# Patient Record
Sex: MALE | Race: WHITE | Employment: UNEMPLOYED | URBAN - METROPOLITAN AREA
[De-identification: names, ages, dates, MRNs, and addresses within clinical notes are randomized per-mention and may not be internally consistent; named-entity substitution may affect disease eponyms.]

---

## 2021-10-20 PROBLEM — M54.2 NECK PAIN: Status: RESOLVED | Noted: 2021-10-20 | Resolved: 2021-10-20

## 2021-10-20 PROBLEM — R20.2 NUMBNESS AND TINGLING IN BOTH HANDS: Status: RESOLVED | Noted: 2021-10-20 | Resolved: 2021-10-20

## 2021-10-20 PROBLEM — M54.2 NECK PAIN: Status: ACTIVE | Noted: 2021-10-20

## 2021-10-20 PROBLEM — R20.2 NUMBNESS AND TINGLING IN BOTH HANDS: Status: ACTIVE | Noted: 2021-10-20

## 2021-10-20 PROBLEM — R55 SYNCOPE, UNSPECIFIED SYNCOPE TYPE: Status: ACTIVE | Noted: 2021-10-20

## 2021-10-20 PROBLEM — R55 SYNCOPE, UNSPECIFIED SYNCOPE TYPE: Status: RESOLVED | Noted: 2021-10-20 | Resolved: 2021-10-20

## 2021-10-20 PROBLEM — R20.0 NUMBNESS AND TINGLING IN BOTH HANDS: Status: ACTIVE | Noted: 2021-10-20

## 2021-10-20 PROBLEM — R20.0 NUMBNESS AND TINGLING IN BOTH HANDS: Status: RESOLVED | Noted: 2021-10-20 | Resolved: 2021-10-20

## 2021-11-15 PROBLEM — M54.12 CERVICAL RADICULOPATHY: Status: ACTIVE | Noted: 2021-11-15

## 2021-12-13 PROBLEM — M50.30 BULGE OF CERVICAL DISC WITHOUT MYELOPATHY: Status: ACTIVE | Noted: 2021-12-13

## 2021-12-13 PROBLEM — M48.02 FORAMINAL STENOSIS OF CERVICAL REGION: Status: ACTIVE | Noted: 2021-12-13

## 2024-04-09 ENCOUNTER — OFFICE VISIT (OUTPATIENT)
Facility: LOCATION | Age: 39
End: 2024-04-09
Payer: COMMERCIAL

## 2024-04-09 VITALS — TEMPERATURE: 97 F | HEART RATE: 76 BPM

## 2024-04-09 DIAGNOSIS — Z12.11 SCREEN FOR COLON CANCER: Primary | ICD-10-CM

## 2024-04-09 PROCEDURE — S0285 CNSLT BEFORE SCREEN COLONOSC: HCPCS | Performed by: SURGERY

## 2024-04-09 RX ORDER — POLYETHYLENE GLYCOL 3350, SODIUM CHLORIDE, SODIUM BICARBONATE, POTASSIUM CHLORIDE 420; 11.2; 5.72; 1.48 G/4L; G/4L; G/4L; G/4L
POWDER, FOR SOLUTION ORAL
Qty: 1 EACH | Refills: 0 | Status: SHIPPED | OUTPATIENT
Start: 2024-04-09

## 2024-04-09 NOTE — H&P
Antwon Mahajan is a 38 year old male  Chief Complaint   Patient presents with    New Patient     NP - Colonoscopy consult, no family hx, bleeding rectum area, no other symptoms.       REFERRED BY    Patient presents with request for colonoscopy.  Patient denies change in bowel habits he denies chronic abdominal pain he denies unexplained weight loss he denies family history colon cancer or colon polyps although his father is .  He is uncertain.       .           Past Medical History:   Diagnosis Date    Arthritis     left heel    ASTHMA      Past Surgical History:   Procedure Laterality Date    OTHER SURGICAL HISTORY      CYST LEFT HAND  [facility]    OTHER SURGICAL HISTORY      LEFT HEEL FRACTURE  [facility]--plate and screws    SHOULDER ARTHROSCOPY Right 2020    torn labrum     Current Outpatient Medications on File Prior to Visit   Medication Sig Dispense Refill    MELATONIN OR Take by mouth.      Omeprazole 40 MG Oral Capsule Delayed Release Take 1 capsule (40 mg total) by mouth daily. Before meal 30 capsule 1     No current facility-administered medications on file prior to visit.     @ALL@  Family History   Problem Relation Age of Onset    Hypertension Father     Heart Disorder Father     Cancer Father         nasal    Circulatory Problems Father         AAA    Musculo-skelatal Disorder Father     Pulmonary Disease Father      Social History     Socioeconomic History    Marital status: Single    Number of children: 0   Occupational History    Occupation: insurance   Tobacco Use    Smoking status: Former     Packs/day: .75     Types: Cigarettes     Quit date: 10/3/2020     Years since quitting: 3.5    Smokeless tobacco: Current     Types: Chew   Vaping Use    Vaping Use: Never used   Substance and Sexual Activity    Alcohol use: No     Alcohol/week: 0.0 standard drinks of alcohol    Drug use: No    Sexual activity: Never   Other Topics Concern     Service No    Blood Transfusions No     Caffeine Concern Yes    Occupational Exposure No    Hobby Hazards No    Sleep Concern Yes    Stress Concern No    Weight Concern No    Special Diet No    Back Care No    Exercise No    Bike Helmet No    Seat Belt Yes    Self-Exams No       ROS     GENERAL HEALTH: otherwise feels well, no weight loss, no fever or chills  SKIN: denies any unusual skin rashes or jaundice  HEENT: denies nasal congestion, sinus pain or sore throat; hearing loss negative,   EYES , no diplopia or vision changes  RESPIRATORY: denies shortness of breath, wheezing or cough   CARDIOVASCULAR: denies chest pain or HAND; no palpitations   GI: denies nausea, vomiting, constipation, diarrhea; no rectal bleeding; no heartburn  GENITAL/: no dysuria, urgency or frequency, no tea colored urine  MUSCULOSKELETAL: no joint complaints upper or lower extremities  HEMATOLOGY: denies hx anemia; denies bruising or excessive bleeding  Endocrine: no weight gain or loss no hot or cold intolerance    EXAM     Pulse 76, temperature 97 °F (36.1 °C), temperature source Temporal.  GENERAL: well developed, well nourished male, in no apparent distress.    MENTAL STATUS :Alert, oriented x 3  PSYCH: normal mood and affect  SKIN: anicteric, no rashes, no bruising  EYES: PERRLA, EOMI, sclera anicteric,  conjunctiva without pallor  HEENT: normocephalic, atraumatic, TMs clear, nares patent, mouth moist, pharynx w/o erythema  NECK: supple, no JVD, no adenopathy, no thyromegaly  RESPIRATORY: clear to auscultation, no rales , rhonchi or wheezing  CARDIOVASCULAR: RRR, murmur negative  ABDOMEN: normal active BS, soft, nondistended  no HSM, no masses or hernias  LYMPHATIC: no axillary , supraclavicular or inguinal lymphadenopathy  EXTREMITIES: no cyanosis, clubbing or edema, no atrophy, full ROM    STUDIES:     Office Visit on 04/06/2022   Component Date Value Ref Range Status    Strep Grp A Screen 04/06/2022 neg  Negative Final    Control Line Present with a clear * 04/06/2022  yes  Yes/No Final    Kit Lot # 04/06/2022 [id number]  Numeric Final    Kit Expiration Date 04/06/2022 02/28/2023  Date Final       ASSESSMENT   Imp: Average risk screening colonoscopy  PLan: Colonoscopy discussed with patient risk of bleeding and bowel perforation with surgery to repair      Meds & Refills for this Visit:  Requested Prescriptions     Signed Prescriptions Disp Refills    PEG 3350-KCl-Na Bicarb-NaCl (TRILYTE) 420 g Oral Recon Soln 1 each 0     Sig: Starting at 4:00 pm the night before procedure, drink 8 ounces of the prep every 15-20 minutes until finished       Imaging & Consults:  None

## 2024-06-04 ENCOUNTER — TELEPHONE (OUTPATIENT)
Facility: LOCATION | Age: 39
End: 2024-06-04

## 2024-06-04 NOTE — TELEPHONE ENCOUNTER
Spoke with Jeaneth at pharmacy and prescription should be ready by the end of the day.  Notified patient who verbalized understanding.

## 2024-06-04 NOTE — TELEPHONE ENCOUNTER
Patient went to [facility] to  his prep prescription and was told it had .  Patient needs new prep prescription sent to his pharmacy.

## 2024-06-14 ENCOUNTER — TELEPHONE (OUTPATIENT)
Facility: LOCATION | Age: 39
End: 2024-06-14

## 2024-06-14 NOTE — TELEPHONE ENCOUNTER
Prior authorization approved for cpt code 13081. Ref #: [id number].     Start date: 6/5/24  End date: 8/3/25    Approved for MAC anesthesia. Ref #: [id number].    Start date: 6/14/24  End date: 8/14/24

## 2024-06-17 ENCOUNTER — TELEPHONE (OUTPATIENT)
Facility: LOCATION | Age: 39
End: 2024-06-17

## 2024-06-17 NOTE — TELEPHONE ENCOUNTER
Patient received a letter in the mail from his insurance informing him that the colonoscopy is covered but his anaesthesia is not, because the anaesthesia is not medically necessary. In the past when this has happened, his doctor has told the insurance about his asthma, which has solved the issue. He is wondering what needs to be done so he doesn't have to pay the anaesthesia out of pocket for the colonoscopy.   Patient has also sent a ttwick message concerning this.    Please advise  Best callback number is [phone]    The number for the clinical review team at his insurance is [phone]

## 2024-06-19 ENCOUNTER — LAB REQUISITION (OUTPATIENT)
Age: 39
End: 2024-06-19

## 2024-06-19 DIAGNOSIS — Z12.11 COLON CANCER SCREENING: ICD-10-CM

## 2024-06-19 PROCEDURE — 88305 TISSUE EXAM BY PATHOLOGIST: CPT | Performed by: SURGERY

## 2024-06-27 ENCOUNTER — TELEPHONE (OUTPATIENT)
Facility: LOCATION | Age: 39
End: 2024-06-27

## 2024-06-27 NOTE — TELEPHONE ENCOUNTER
----- Message from Jonah Tellez sent at 6/27/2024  1:58 PM CDT -----  Recall colonoscopy 3 years  --------------    Recall placed, and Care Gaps updated.

## 2024-09-26 ENCOUNTER — OFFICE VISIT (OUTPATIENT)
Dept: FAMILY MEDICINE CLINIC | Facility: CLINIC | Age: 39
End: 2024-09-26
Payer: COMMERCIAL

## 2024-09-26 ENCOUNTER — PATIENT MESSAGE (OUTPATIENT)
Dept: FAMILY MEDICINE CLINIC | Facility: CLINIC | Age: 39
End: 2024-09-26

## 2024-09-26 ENCOUNTER — LAB ENCOUNTER (OUTPATIENT)
Dept: LAB | Age: 39
End: 2024-09-26
Attending: FAMILY MEDICINE
Payer: COMMERCIAL

## 2024-09-26 VITALS
HEIGHT: 69 IN | SYSTOLIC BLOOD PRESSURE: 110 MMHG | BODY MASS INDEX: 27.85 KG/M2 | DIASTOLIC BLOOD PRESSURE: 78 MMHG | TEMPERATURE: 98 F | HEART RATE: 60 BPM | WEIGHT: 188 LBS | RESPIRATION RATE: 16 BRPM

## 2024-09-26 DIAGNOSIS — Z12.5 SCREENING FOR MALIGNANT NEOPLASM OF PROSTATE: ICD-10-CM

## 2024-09-26 DIAGNOSIS — R10.2 SUPRAPUBIC PAIN: ICD-10-CM

## 2024-09-26 DIAGNOSIS — R93.5 ABNORMAL FINDINGS ON DIAGNOSTIC IMAGING OF ABDOMEN: ICD-10-CM

## 2024-09-26 DIAGNOSIS — R36.1 HEMATOSPERMIA: ICD-10-CM

## 2024-09-26 DIAGNOSIS — Z00.00 ROUTINE HEALTH MAINTENANCE: Primary | ICD-10-CM

## 2024-09-26 DIAGNOSIS — Z00.00 ROUTINE HEALTH MAINTENANCE: ICD-10-CM

## 2024-09-26 DIAGNOSIS — G47.33 OSA ON CPAP: ICD-10-CM

## 2024-09-26 DIAGNOSIS — N50.811 PAIN IN RIGHT TESTICLE: ICD-10-CM

## 2024-09-26 PROBLEM — E06.3 HASHIMOTO'S THYROIDITIS: Status: ACTIVE | Noted: 2024-01-09

## 2024-09-26 LAB
ALBUMIN SERPL-MCNC: 5.1 G/DL (ref 3.2–4.8)
ALBUMIN/GLOB SERPL: 1.8 {RATIO} (ref 1–2)
ALP LIVER SERPL-CCNC: 67 U/L
ALT SERPL-CCNC: 25 U/L
ANION GAP SERPL CALC-SCNC: 2 MMOL/L (ref 0–18)
AST SERPL-CCNC: 22 U/L (ref ?–34)
BASOPHILS # BLD AUTO: 0.05 X10(3) UL (ref 0–0.2)
BASOPHILS NFR BLD AUTO: 0.7 %
BILIRUB SERPL-MCNC: 0.7 MG/DL (ref 0.3–1.2)
BILIRUB UR QL STRIP.AUTO: NEGATIVE
BUN BLD-MCNC: 14 MG/DL (ref 9–23)
CALCIUM BLD-MCNC: 10.4 MG/DL (ref 8.7–10.4)
CHLORIDE SERPL-SCNC: 107 MMOL/L (ref 98–112)
CHOLEST SERPL-MCNC: 226 MG/DL (ref ?–200)
CLARITY UR REFRACT.AUTO: CLEAR
CO2 SERPL-SCNC: 28 MMOL/L (ref 21–32)
COMPLEXED PSA SERPL-MCNC: 0.48 NG/ML (ref ?–4)
CREAT BLD-MCNC: 1.21 MG/DL
EGFRCR SERPLBLD CKD-EPI 2021: 77 ML/MIN/1.73M2 (ref 60–?)
EOSINOPHIL # BLD AUTO: 0.21 X10(3) UL (ref 0–0.7)
EOSINOPHIL NFR BLD AUTO: 2.9 %
ERYTHROCYTE [DISTWIDTH] IN BLOOD BY AUTOMATED COUNT: 12.7 %
EST. AVERAGE GLUCOSE BLD GHB EST-MCNC: 108 MG/DL (ref 68–126)
FASTING PATIENT LIPID ANSWER: YES
FASTING STATUS PATIENT QL REPORTED: YES
GLOBULIN PLAS-MCNC: 2.8 G/DL (ref 2–3.5)
GLUCOSE BLD-MCNC: 94 MG/DL (ref 70–99)
GLUCOSE UR STRIP.AUTO-MCNC: NORMAL MG/DL
HBA1C MFR BLD: 5.4 % (ref ?–5.7)
HCT VFR BLD AUTO: 41.8 %
HDLC SERPL-MCNC: 48 MG/DL (ref 40–59)
HGB BLD-MCNC: 14.4 G/DL
IMM GRANULOCYTES # BLD AUTO: 0.02 X10(3) UL (ref 0–1)
IMM GRANULOCYTES NFR BLD: 0.3 %
KETONES UR STRIP.AUTO-MCNC: NEGATIVE MG/DL
LDLC SERPL CALC-MCNC: 156 MG/DL (ref ?–100)
LEUKOCYTE ESTERASE UR QL STRIP.AUTO: NEGATIVE
LYMPHOCYTES # BLD AUTO: 2.65 X10(3) UL (ref 1–4)
LYMPHOCYTES NFR BLD AUTO: 37.1 %
MCH RBC QN AUTO: 31.8 PG (ref 26–34)
MCHC RBC AUTO-ENTMCNC: 34.4 G/DL (ref 31–37)
MCV RBC AUTO: 92.3 FL
MONOCYTES # BLD AUTO: 0.62 X10(3) UL (ref 0.1–1)
MONOCYTES NFR BLD AUTO: 8.7 %
NEUTROPHILS # BLD AUTO: 3.6 X10 (3) UL (ref 1.5–7.7)
NEUTROPHILS # BLD AUTO: 3.6 X10(3) UL (ref 1.5–7.7)
NEUTROPHILS NFR BLD AUTO: 50.3 %
NITRITE UR QL STRIP.AUTO: NEGATIVE
NONHDLC SERPL-MCNC: 178 MG/DL (ref ?–130)
OSMOLALITY SERPL CALC.SUM OF ELEC: 284 MOSM/KG (ref 275–295)
PH UR STRIP.AUTO: 5.5 [PH] (ref 5–8)
PLATELET # BLD AUTO: 259 10(3)UL (ref 150–450)
POTASSIUM SERPL-SCNC: 5 MMOL/L (ref 3.5–5.1)
PROT SERPL-MCNC: 7.9 G/DL (ref 5.7–8.2)
PROT UR STRIP.AUTO-MCNC: NEGATIVE MG/DL
RBC # BLD AUTO: 4.53 X10(6)UL
RBC UR QL AUTO: NEGATIVE
SODIUM SERPL-SCNC: 137 MMOL/L (ref 136–145)
SP GR UR STRIP.AUTO: 1.02 (ref 1–1.03)
TRIGL SERPL-MCNC: 121 MG/DL (ref 30–149)
TSI SER-ACNC: 2.97 MIU/ML (ref 0.55–4.78)
UROBILINOGEN UR STRIP.AUTO-MCNC: NORMAL MG/DL
VLDLC SERPL CALC-MCNC: 23 MG/DL (ref 0–30)
WBC # BLD AUTO: 7.2 X10(3) UL (ref 4–11)

## 2024-09-26 PROCEDURE — 81003 URINALYSIS AUTO W/O SCOPE: CPT

## 2024-09-26 PROCEDURE — 80053 COMPREHEN METABOLIC PANEL: CPT

## 2024-09-26 PROCEDURE — 36415 COLL VENOUS BLD VENIPUNCTURE: CPT

## 2024-09-26 PROCEDURE — 83036 HEMOGLOBIN GLYCOSYLATED A1C: CPT

## 2024-09-26 PROCEDURE — 85025 COMPLETE CBC W/AUTO DIFF WBC: CPT

## 2024-09-26 PROCEDURE — 80061 LIPID PANEL: CPT

## 2024-09-26 PROCEDURE — 84443 ASSAY THYROID STIM HORMONE: CPT

## 2024-09-26 PROCEDURE — 99203 OFFICE O/P NEW LOW 30 MIN: CPT | Performed by: FAMILY MEDICINE

## 2024-09-26 PROCEDURE — 99386 PREV VISIT NEW AGE 40-64: CPT | Performed by: FAMILY MEDICINE
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
## 2024-09-26 NOTE — PATIENT INSTRUCTIONS
Heart Scan:  [email or web address]  OR call [phone]

## 2024-10-14 ENCOUNTER — PATIENT MESSAGE (OUTPATIENT)
Dept: FAMILY MEDICINE CLINIC | Facility: CLINIC | Age: 39
End: 2024-10-14

## 2024-10-15 ENCOUNTER — PATIENT MESSAGE (OUTPATIENT)
Dept: FAMILY MEDICINE CLINIC | Facility: CLINIC | Age: 39
End: 2024-10-15

## 2024-10-18 ENCOUNTER — ORDER TRANSCRIPTION (OUTPATIENT)
Dept: ADMINISTRATIVE | Facility: HOSPITAL | Age: 39
End: 2024-10-18

## 2024-10-18 DIAGNOSIS — Z13.6 SCREENING FOR CARDIOVASCULAR CONDITION: Primary | ICD-10-CM

## 2024-10-21 ENCOUNTER — HOSPITAL ENCOUNTER (OUTPATIENT)
Dept: CT IMAGING | Facility: HOSPITAL | Age: 39
End: 2024-10-21
Attending: FAMILY MEDICINE

## 2024-10-21 DIAGNOSIS — Z13.6 SCREENING FOR CARDIOVASCULAR CONDITION: ICD-10-CM

## 2024-10-29 ENCOUNTER — HOSPITAL ENCOUNTER (OUTPATIENT)
Dept: MRI IMAGING | Facility: HOSPITAL | Age: 39
Discharge: HOME | End: 2024-10-29
Attending: FAMILY MEDICINE
Payer: COMMERCIAL

## 2024-10-29 DIAGNOSIS — R93.5 ABNORMAL FINDINGS ON DIAGNOSTIC IMAGING OF ABDOMEN: ICD-10-CM

## 2024-10-29 PROCEDURE — 74183 MRI ABD W/O CNTR FLWD CNTR: CPT | Performed by: FAMILY MEDICINE

## 2024-10-29 PROCEDURE — A9575 INJ GADOTERATE MEGLUMI 0.1ML: HCPCS

## 2024-10-29 RX ORDER — GADOTERATE MEGLUMINE 376.9 MG/ML
20 INJECTION INTRAVENOUS
Status: COMPLETED | OUTPATIENT
Start: 2024-10-29 | End: 2024-10-29

## 2024-10-29 RX ADMIN — GADOTERATE MEGLUMINE 17 ML: 376.9 INJECTION INTRAVENOUS at 07:58:00

## 2024-11-21 ENCOUNTER — HOSPITAL ENCOUNTER (OUTPATIENT)
Dept: ULTRASOUND IMAGING | Facility: HOSPITAL | Age: 39
Discharge: HOME | End: 2024-11-21
Attending: FAMILY MEDICINE
Payer: COMMERCIAL

## 2024-11-21 DIAGNOSIS — R10.2 SUPRAPUBIC PAIN: ICD-10-CM

## 2024-11-21 DIAGNOSIS — N50.811 PAIN IN RIGHT TESTICLE: ICD-10-CM

## 2024-11-21 DIAGNOSIS — R36.1 HEMATOSPERMIA: ICD-10-CM

## 2024-11-21 PROCEDURE — 76870 US EXAM SCROTUM: CPT | Performed by: FAMILY MEDICINE

## 2024-11-21 PROCEDURE — 93975 VASCULAR STUDY: CPT | Performed by: FAMILY MEDICINE

## 2024-12-27 ENCOUNTER — OFFICE VISIT (OUTPATIENT)
Dept: FAMILY MEDICINE CLINIC | Facility: CLINIC | Age: 39
End: 2024-12-27
Payer: COMMERCIAL

## 2024-12-27 VITALS
WEIGHT: 196.63 LBS | TEMPERATURE: 98 F | HEART RATE: 60 BPM | SYSTOLIC BLOOD PRESSURE: 102 MMHG | RESPIRATION RATE: 16 BRPM | BODY MASS INDEX: 29 KG/M2 | DIASTOLIC BLOOD PRESSURE: 64 MMHG | OXYGEN SATURATION: 97 %

## 2024-12-27 DIAGNOSIS — M54.50 ACUTE BILATERAL LOW BACK PAIN WITHOUT SCIATICA: ICD-10-CM

## 2024-12-27 DIAGNOSIS — E78.2 MODERATE MIXED HYPERLIPIDEMIA NOT REQUIRING STATIN THERAPY: Primary | ICD-10-CM

## 2024-12-27 PROCEDURE — 99213 OFFICE O/P EST LOW 20 MIN: CPT | Performed by: FAMILY MEDICINE
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
## 2025-02-24 ENCOUNTER — PATIENT MESSAGE (OUTPATIENT)
Facility: CLINIC | Age: 40
End: 2025-02-24

## 2025-03-17 ENCOUNTER — OFFICE VISIT (OUTPATIENT)
Facility: CLINIC | Age: 40
End: 2025-03-17
Payer: COMMERCIAL

## 2025-03-17 VITALS
DIASTOLIC BLOOD PRESSURE: 64 MMHG | WEIGHT: 195 LBS | BODY MASS INDEX: 28.88 KG/M2 | HEART RATE: 64 BPM | SYSTOLIC BLOOD PRESSURE: 110 MMHG | HEIGHT: 69 IN | RESPIRATION RATE: 16 BRPM | OXYGEN SATURATION: 96 %

## 2025-03-17 DIAGNOSIS — G47.33 OSA ON CPAP: Primary | ICD-10-CM

## 2025-03-17 DIAGNOSIS — M48.02 FORAMINAL STENOSIS OF CERVICAL REGION: ICD-10-CM

## 2025-03-17 DIAGNOSIS — E06.3 HASHIMOTO'S THYROIDITIS: ICD-10-CM

## 2025-03-17 PROCEDURE — 99204 OFFICE O/P NEW MOD 45 MIN: CPT | Performed by: OTHER

## 2025-03-17 NOTE — PROGRESS NOTES
[facility]  SLEEP PROGRESS NOTE        HPI:   This is a 39 year old male coming in for   Chief Complaint   Patient presents with    New Patient     Pt here fro sleep consult.  Pt requests different type  of mask and current machine is from   Sleep questionnaire:        HPI: Prior DULY   2022 AHI 17.8, SaO2 virgilio is 65%  Sleep hours 830/930 until   Works from home      Has younger kids  Sleeps on sides and back    Benefits greatly from using cpap   Using nasal pillows  Weight stable    RLS intermittent        Patient: Sleep review of systems today: see form.      Pt  PCP:  Ilene Young DO  No referring provider defined for this encounter.           No data to display                    Past Medical History:    Allergic rhinitis    Anxiety    Arthritis    left heel    ASTHMA    Asthma (HCC)    Hyperthyroidism    Hypothyroidism    Sleep apnea     Past Surgical History:   Procedure Laterality Date    Colonoscopy      Other surgical history      CYST LEFT HAND  [facility]    Other surgical history      LEFT HEEL FRACTURE  [facility]--plate and screws    Shoulder arthroscopy Right 2020    torn labrum     Social History:  Social History     Social History Narrative    Not on file     Social History     Socioeconomic History    Marital status: Life Partner    Number of children: 0   Occupational History    Occupation: insurance   Tobacco Use    Smoking status: Former     Current packs/day: 0.00     Average packs/day: 0.8 packs/day for 26.8 years (20.1 ttl pk-yrs)     Types: Cigarettes, Cigars     Start date:      Quit date: 10/3/2022     Years since quittin.4    Smokeless tobacco: Former     Types: Chew     Quit date: 2023   Vaping Use    Vaping status: Never Used   Substance and Sexual Activity    Alcohol use: Yes     Comment: 4 drinks a weekend    Drug use: No    Sexual activity: Never   Other Topics Concern     Service No    Blood Transfusions No    Caffeine  Concern Yes    Occupational Exposure No    Hobby Hazards No    Sleep Concern Yes    Stress Concern No    Weight Concern No    Special Diet No    Back Care No    Exercise No    Bike Helmet No    Seat Belt Yes    Self-Exams No     Family History:  Family History   Problem Relation Age of Onset    Hypertension Father     Heart Disorder Father     Cancer Father         nasal    Circulatory Problems Father         AAA    Musculo-skelatal Disorder Father     Pulmonary Disease Father      Allergies:  Allergies[1]  Current Meds:  No current outpatient medications on file.      Counseling given: Not Answered         Problem List:  Patient Active Problem List   Diagnosis    Cervical radiculopathy    Foraminal stenosis of cervical region    Bulge of cervical disc without myelopathy    Hashimoto's thyroiditis    VINOD on CPAP       REVIEW OF SYSTEMS:   Review of Systems    EXAM:   /64   Pulse 64   Resp 16   Ht 5' 9\" (1.753 m)   Wt 195 lb (88.5 kg)   SpO2 96%   BMI 28.80 kg/m²  Estimated body mass index is 28.8 kg/m² as calculated from the following:    Height as of this encounter: 5' 9\" (1.753 m).    Weight as of this encounter: 195 lb (88.5 kg).   Neck in inches:      Wt Readings from Last 6 Encounters:   03/17/25 195 lb (88.5 kg)   12/27/24 196 lb 9.6 oz (89.2 kg)   09/26/24 188 lb (85.3 kg)   04/06/22 189 lb (85.7 kg)   01/14/22 200 lb (90.7 kg)   12/16/21 198 lb (89.8 kg)     BP Readings from Last 3 Encounters:   03/17/25 110/64   12/27/24 102/64   09/26/24 110/78     Pulse Readings from Last 3 Encounters:   03/17/25 64   12/27/24 60   09/26/24 60     SpO2 Readings from Last 3 Encounters:   03/17/25 96%   12/27/24 97%   04/06/22 97%      Ideal body weight: 70.7 kg (155 lb 13.8 oz)  Adjusted ideal body weight: 77.8 kg (171 lb 8.3 oz)    Vital signs reviewed.  Physical Exam    ASSESSMENT AND PLAN:   1. VINOD on CPAP  Needs new DME and supplies  Patient is using and benefiting from regular cpap use.  Patient was  instructed to clean equipment on a weekly basis.  Patient was instructed to keep up to date with supplies.  Patient was informed to avoid drowsy driving, or using heavy machinery.  Patient was instructed to followup on a annual basis.   2. Hashimoto's thyroiditis    3. Foraminal stenosis of cervical region    There are no Patient Instructions on file for this visit.    Independent interpretation of Sleep Download as defined above.  Continue with Rx management of Sleep apnea with PAP therapy.    COMPLIANCE is required by insurance for 4 hours a night most nights of the week.    Advised if still with sleep apnea and not using CPAP has a 7 fold increase in risk of heart attack, stroke, abnormal heart rhythm  and death,  increased risk of driving accidents.     Advised to refrain from driving when sleepy.      Recommend weight loss, and maintain and optimal BMI with Exercise 30 minutes most days to target heart rate .     Advised patient to change filters,masks,hoses  and tubes and equiptment on a  regular schedule.    Filters and seals shall be changed every 1 month,  Hoses every 3 months,   CPAP mask and humidifier chamber changed every 6 month  with the durable medical equipment provider.         Meds & Refills for this Visit:  Requested Prescriptions      No prescriptions requested or ordered in this encounter       Outcome: Parent verbalizes understanding. Parent is notified to call with any questions, complications, allergies, or worsening or changing symptoms.  Parent is to call with any side effects or complications from the treatments as a result of today.     \" This note was created utilizing Dragon speech recognition software.  Please excuse any grammatical errors. Call my office if you have any questions regarding this note. \"     Karon Sandoval,   3/17/2025  1:29 PM       [1] No Known Allergies

## 2025-04-05 ENCOUNTER — HOSPITAL ENCOUNTER (OUTPATIENT)
Age: 40
Discharge: HOME | End: 2025-04-05
Payer: COMMERCIAL

## 2025-04-05 ENCOUNTER — APPOINTMENT (OUTPATIENT)
Dept: GENERAL RADIOLOGY | Age: 40
End: 2025-04-05
Attending: NURSE PRACTITIONER
Payer: COMMERCIAL

## 2025-04-05 VITALS
DIASTOLIC BLOOD PRESSURE: 69 MMHG | TEMPERATURE: 98 F | RESPIRATION RATE: 18 BRPM | SYSTOLIC BLOOD PRESSURE: 100 MMHG | OXYGEN SATURATION: 97 % | HEART RATE: 77 BPM

## 2025-04-05 DIAGNOSIS — S59.901A INJURY OF RIGHT ELBOW, INITIAL ENCOUNTER: ICD-10-CM

## 2025-04-05 DIAGNOSIS — S52.134A CLOSED NONDISPLACED FRACTURE OF NECK OF RIGHT RADIUS, INITIAL ENCOUNTER: Primary | ICD-10-CM

## 2025-04-05 DIAGNOSIS — S69.91XA INJURY OF RIGHT WRIST, INITIAL ENCOUNTER: ICD-10-CM
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
## 2025-04-05 PROCEDURE — A4565 SLINGS: HCPCS | Performed by: NURSE PRACTITIONER

## 2025-04-05 PROCEDURE — 73080 X-RAY EXAM OF ELBOW: CPT | Performed by: NURSE PRACTITIONER

## 2025-04-05 PROCEDURE — 73110 X-RAY EXAM OF WRIST: CPT | Performed by: NURSE PRACTITIONER

## 2025-04-05 PROCEDURE — 99204 OFFICE O/P NEW MOD 45 MIN: CPT | Performed by: NURSE PRACTITIONER

## 2025-04-05 PROCEDURE — 29105 APPLICATION LONG ARM SPLINT: CPT | Performed by: NURSE PRACTITIONER

## 2025-04-05 RX ORDER — HYDROCODONE BITARTRATE AND ACETAMINOPHEN 5; 325 MG/1; MG/1
1 TABLET ORAL EVERY 8 HOURS PRN
Qty: 12 TABLET | Refills: 0 | Status: SHIPPED | OUTPATIENT
Start: 2025-04-05 | End: 2025-04-07

## 2025-04-05 RX ORDER — NAPROXEN 500 MG/1
500 TABLET ORAL 2 TIMES DAILY WITH MEALS
Qty: 20 TABLET | Refills: 0 | Status: SHIPPED | OUTPATIENT
Start: 2025-04-05 | End: 2025-04-15

## 2025-04-05 NOTE — ED PROVIDER NOTES
Patient Seen in: [facility]mediate [facility]      History     Chief Complaint   Patient presents with    Arm or Hand Injury     Stated Complaint: arm pain    Subjective: This is a 39-year-old male, past medical history of asthma, arthritis, anxiety, allergic rhinitis, obstructive sleep apnea, thyroid disorder, presents to immediate care clinic for evaluation of right upper extremity injury, sustained yesterday while playing basketball with his children.  Patient reports FOOSH like injury and mechanism.  He does state that his arm was in a position of like supination and pronation.  He is tender to wrist and elbow.  Patient reports \"I hit my funny bone\".  Patient has decreased range of motion of elbow.  Pain with supination and pronation as well as flexion extension.  CMS intact distally.  Strong radial pulse.  No obvious asymmetry deformity.  Positive soft tissue swelling of elbow joint. AOX4  The history is provided by the patient.             Objective:     Past Medical History:    Allergic rhinitis    Anxiety    Arthritis    left heel    ASTHMA    Asthma (HCC)    Hyperthyroidism    Hypothyroidism    Sleep apnea              Past Surgical History:   Procedure Laterality Date    Colonoscopy      Other surgical history      CYST LEFT HAND  [facility] OR[facility]O    Other surgical history      LEFT HEEL FRACTURE  [facility]--plate and screws    Shoulder arthroscopy Right 2020    torn labrum                Social History     Socioeconomic History    Marital status: Life Partner    Number of children: 0   Occupational History    Occupation: insurance   Tobacco Use    Smoking status: Former     Current packs/day: 0.00     Average packs/day: 0.8 packs/day for 26.8 years (20.1 ttl pk-yrs)     Types: Cigarettes, Cigars     Start date:      Quit date: 10/3/2022     Years since quittin.5    Smokeless tobacco: Former     Types: Chew     Quit date: 2023   Vaping Use    Vaping status: Never Used   Substance and Sexual  Activity    Alcohol use: Yes     Comment: 4 drinks a weekend    Drug use: No    Sexual activity: Never   Other Topics Concern     Service No    Blood Transfusions No    Caffeine Concern Yes    Occupational Exposure No    Hobby Hazards No    Sleep Concern Yes    Stress Concern No    Weight Concern No    Special Diet No    Back Care No    Exercise No    Bike Helmet No    Seat Belt Yes    Self-Exams No              Review of Systems   Constitutional: Negative.    Musculoskeletal:  Positive for arthralgias and joint swelling. Negative for back pain, gait problem, myalgias, neck pain and neck stiffness.   Skin: Negative.  Negative for color change and wound.       Positive for stated complaint: arm pain  Other systems are as noted in HPI.  Constitutional and vital signs reviewed.      All other systems reviewed and negative except as noted above.    Physical Exam     ED Triage Vitals [04/05/25 1509]   /69   Pulse 77   Resp 18   Temp 98.2 °F (36.8 °C)   Temp src Oral   SpO2 97 %   O2 Device None (Room air)       Current Vitals:   Vital Signs  BP: 100/69  Pulse: 77  Resp: 18  Temp: 98.2 °F (36.8 °C)  Temp src: Oral    Oxygen Therapy  SpO2: 97 %  O2 Device: None (Room air)        Physical Exam  Constitutional:       General: He is not in acute distress.     Appearance: Normal appearance. He is not ill-appearing or toxic-appearing.   Cardiovascular:      Rate and Rhythm: Normal rate.      Pulses: Normal pulses.   Pulmonary:      Effort: Pulmonary effort is normal.   Musculoskeletal:         General: Swelling, tenderness and signs of injury present. No deformity.      Right elbow: Swelling and effusion present. No deformity or lacerations. Decreased range of motion. Tenderness present in radial head, medial epicondyle and lateral epicondyle.        Arms:       Comments: Strong radial pulse.  Good cap refill   Skin:     General: Skin is warm.      Capillary Refill: Capillary refill takes less than 2 seconds.       Findings: No erythema.   Neurological:      General: No focal deficit present.      Mental Status: He is alert and oriented to person, place, and time.             ED Course   Labs Reviewed - No data to display     XR WRIST COMPLETE (MIN 3 VIEWS), RIGHT (CPT=73110)    Result Date: 4/5/2025  CONCLUSION:  No acute fracture or dislocation right wrist.   LOCATION:  [facility]   Dictated by (CST): Tomeka Peterson MD on 4/05/2025 at 3:34 PM     Finalized by (CST): Tomeka Peterson MD on 4/05/2025 at 3:34 PM       XR ELBOW, COMPLETE (MIN 3 VIEWS), RIGHT (CPT=73080)    Result Date: 4/5/2025  CONCLUSION:  A right elbow effusion.  Nondisplaced fracture of the radial neck.  No dislocation.   LOCATION:  [facility]    Dictated by (CST): Tomeka Peterson MD on 4/05/2025 at 3:32 PM     Finalized by (CST): Tomeka Peterson MD on 4/05/2025 at 3:33 PM                 MDM      Differentials include: Elbow fracture, joint effusion of the elbow, ulnar nerve entrapment, wrist fracture, versus sprain.    Patient does have wrist tenderness, specifically with flexion, extension, supination pronation.  Supination pronation is worse.  He does have strong radial pulse.  Good cap refill.    Patient does have some soft tissue swelling to right elbow.  He prefers to keep elbow in a form of flexion.  Pain with extension on active range of motion.  Patient has limited range of motion.  With passive range of motion he is limited in his extension of elbow.    Mechanism supports likely fracture radial head or neck.  Patient had FOOSH like injury mechanism and position of supination.      However, due to tenderness and physical exam findings, x-ray of elbow and wrist obtained.  X-rays independently reviewed by provider.  No acute fracture of right wrist.  There is small effusion of elbow joint.  Per radiologist, there is a nondisplaced fracture of the radial neck.      Due to patient's pain, limited range of motion, decision was made to place patient in long-arm  postmold versus sling.    Postmold checked by provider.  There is no neurovascular compromise after postmold application.  Good cap refill distally.  Good  strength.  Sling was applied as well.  Sling in good alignment.  No neurovascular compromise after sling application.    Patient is aware to follow-up with orthopedic specialist.  Educated patient on RICE therapy.  Patient agreeable for naproxen and Norco.  He is aware of medication safety precautions regarding Norco use.    Patient is aware of signs symptoms that would warrant immediate ER evaluation.  He verbalized understanding agrees with plan of care        Medical Decision Making  Amount and/or Complexity of Data Reviewed  Radiology: ordered and independent interpretation performed.        Disposition and Plan     Clinical Impression:  1. Closed nondisplaced fracture of neck of right radius, initial encounter    2. Fall, initial encounter    3. Injury of right elbow, initial encounter    4. Injury of right wrist, initial encounter         Disposition:  Discharge  4/5/2025  3:55 pm    Follow-up:  Ruby Lyons MD  [address] [address]  [phone]    Schedule an appointment as soon as possible for a visit   This is an orthopedic specialist that I would like you to follow up with.          Medications Prescribed:  Discharge Medication List as of 4/5/2025  3:55 PM        START taking these medications    Details   naproxen 500 MG Oral Tab Take 1 tablet (500 mg total) by mouth 2 (two) times daily with meals for 10 days., Normal, Disp-20 tablet, R-0      HYDROcodone-acetaminophen 5-325 MG Oral Tab Take 1 tablet by mouth every 8 (eight) hours as needed for Pain., Normal, Disp-12 tablet, R-0Dr. Haleigh Andrew                 Supplementary Documentation:

## 2025-04-05 NOTE — DISCHARGE INSTRUCTIONS
As discussed, does appear that there is a nondisplaced fracture of the right radial neck.  You are placed in a cast because ear pain and limited range of motion.  As we talked about, the sling only needs to be worn while up awake alert active, remove while sleeping and resting.  Elevate extremity while sleeping and resting.    Tylenol every 4 hours.  Naproxen twice a day as needed.  Do not take other NSAIDs while taking naproxen: Motrin, Advil, Aleve, ibuprofen, aspirin.    Norco as needed for pain that is significant and severe.  You should not work, drink, drive on this medication it is a controlled substance.  Please be aware that Norco has 325 mg of Tylenol already in it, your Tylenol intake for a 24-hour period should not exceed 4000 mg.    Please take an over-the-counter stool softener while taking Norco.    Follow-up with orthopedic specialist, you may give his office a call on Monday.    If you have any uncontrolled pain, numbness tingling weakness, discoloration, temperature change, please go to emergency room.

## 2025-04-05 NOTE — ED INITIAL ASSESSMENT (HPI)
Tried to stop fall yesterday w right hand.  Today increased pain to entire right arm.  Previous surgery to r shoulder.  CMS intact.

## 2025-04-07 ENCOUNTER — TELEPHONE (OUTPATIENT)
Dept: FAMILY MEDICINE CLINIC | Facility: CLINIC | Age: 40
End: 2025-04-07

## 2025-04-07 ENCOUNTER — TELEPHONE (OUTPATIENT)
Dept: ORTHOPEDICS CLINIC | Facility: CLINIC | Age: 40
End: 2025-04-07

## 2025-04-07 DIAGNOSIS — S52.134A CLOSED NONDISPLACED FRACTURE OF NECK OF RIGHT RADIUS, INITIAL ENCOUNTER: ICD-10-CM

## 2025-04-07 RX ORDER — HYDROCODONE BITARTRATE AND ACETAMINOPHEN 5; 325 MG/1; MG/1
1 TABLET ORAL EVERY 8 HOURS PRN
Qty: 12 TABLET | Refills: 0 | Status: SHIPPED | OUTPATIENT
Start: 2025-04-07 | End: 2025-04-11

## 2025-04-07 NOTE — TELEPHONE ENCOUNTER
Please book with Dr. Maldonado this week    DOI 4/4/25    Nondisplaced fracture of the radial neck

## 2025-04-07 NOTE — TELEPHONE ENCOUNTER
Spoke with patient, all information given. Patient verbalized understanding. No further concerns.

## 2025-04-07 NOTE — TELEPHONE ENCOUNTER
DOI- 04/05/2025- seen in IC- Closed nondisplaced fracture of neck of right radius, initial encounter     Would you like new images prior to appt?     Images in EMR~    Results below~    Narrative   PROCEDURE:  XR ELBOW, COMPLETE (MIN 3 VIEWS), RIGHT (CPT=73080)     TECHNIQUE:  Three views were obtained.     COMPARISON:  None.     INDICATIONS:  fall while playing basketball yesterday, hit funny bone. pain with ROM,     PATIENT STATED HISTORY: (As transcribed by Technologist)  Basketball injury and a fall. Right hsnd pain and elblow lateral pain                          Impression   CONCLUSION:  A right elbow effusion.     Nondisplaced fracture of the radial neck.     No dislocation.        LOCATION:  [facility]

## 2025-04-07 NOTE — TELEPHONE ENCOUNTER
Pt is calling he broke his elbow on Friday and went to  and they gave him Hydrocodone-acetaminophen and he is out of the medication he sees ortho on Wednesday but he was told that he had to get enough medication to get to his appt on Wednesday. Please send to [place]

## 2025-04-09 ENCOUNTER — TELEPHONE (OUTPATIENT)
Dept: ORTHOPEDICS CLINIC | Facility: CLINIC | Age: 40
End: 2025-04-09

## 2025-04-09 ENCOUNTER — OFFICE VISIT (OUTPATIENT)
Dept: ORTHOPEDICS CLINIC | Facility: CLINIC | Age: 40
End: 2025-04-09
Payer: COMMERCIAL

## 2025-04-09 VITALS — HEIGHT: 70 IN | BODY MASS INDEX: 27.2 KG/M2 | WEIGHT: 190 LBS

## 2025-04-09 DIAGNOSIS — S52.134A CLOSED NONDISPLACED FRACTURE OF NECK OF RIGHT RADIUS, INITIAL ENCOUNTER: Primary | ICD-10-CM

## 2025-04-09 PROCEDURE — 99203 OFFICE O/P NEW LOW 30 MIN: CPT | Performed by: ORTHOPAEDIC SURGERY

## 2025-04-09 PROCEDURE — 24655 CLTX RDL HEAD/NCK FX W/MNPJ: CPT | Performed by: ORTHOPAEDIC SURGERY

## 2025-04-09 RX ORDER — TRAMADOL HYDROCHLORIDE 50 MG/1
TABLET ORAL
Qty: 10 TABLET | Refills: 1 | Status: SHIPPED | OUTPATIENT
Start: 2025-04-09

## 2025-04-09 NOTE — H&P
Orthopaedic Surgery  [address] [place] [id number]  [phone]     NEW PATIENT VISIT - HISTORY AND PHYSICAL EXAMINATION     Name: Antwon Mahajan   MRN: [id number]  Date: 2025     CC: Right elbow pain    REFERRED BY: Ilene Young DO    HPI:   Antwon Mahajan is a very pleasant 39 year old right-hand dominant male who presents today for evaluation of right elbow injury onset 24 after falling and landing on the elbow while playing basketball. Pain is rated up to 9/10 with swelling, weakness and tingling. This improves with pain medication and rest.     He lives with his family. Works in insurance. Enjoys hiking and fishing.     PMH:   Past Medical History[1]    PAST SURGICAL HX:  Past Surgical History[2]    FAMILY HX:  Family History[3]    ALLERGIES:  Patient has no known allergies.    MEDICATIONS: Current Medications[4]    ROS: A complete 10 point review of systems performed and negative aside from the aforementioned per history of present illness.     SOCIAL HX:  Social History     Tobacco Use    Smoking status: Former     Current packs/day: 0.00     Average packs/day: 0.8 packs/day for 26.8 years (20.1 ttl pk-yrs)     Types: Cigarettes, Cigars     Start date:      Quit date: 10/3/2022     Years since quittin.5    Smokeless tobacco: Former     Types: Chew     Quit date: 2023   Substance Use Topics    Alcohol use: Yes     Comment: 4 drinks a weekend       PE:   Vitals:    25 0744   Weight: 190 lb   Height: 5' 10\" (1.778 m)     Estimated body mass index is 27.26 kg/m² as calculated from the following:    Height as of this encounter: 5' 10\" (1.778 m).    Weight as of this encounter: 190 lb.    Physical Exam  Constitutional:       Appearance: Normal appearance.   HENT:      Head: Normocephalic and atraumatic.   Eyes:      Extraocular Movements: Extraocular movements intact.   Neck:      Musculoskeletal: Normal range of motion and neck supple.   Cardiovascular:       Pulses: Normal pulses.   Pulmonary:      Effort: Pulmonary effort is normal. No respiratory distress.   Abdominal:      General: There is no distension.   Skin:     General: Skin is warm.      Capillary Refill: Capillary refill takes less than 2 seconds.      Findings: No bruising.   Neurological:      General: No focal deficit present.      Mental Status: Alert.   Psychiatric:         Mood and Affect: Mood normal.     Examination of the right elbow demonstrates:   Skin is intact, warm and dry.     Deformity:   none  Atrophy:   none      Palpation:     Medial Epicondyle Negative  Lateral Epicondyle Negative  Olecranon   Negative    ROM:   Flexion   full and symmetric  Extension  Lacking 30 degrees  Pronation  Lacking 10 degrees  Supination  Lacking 10 degrees    Strength:   Supraspinatus:   5/5  Subscapularis:   5/5  Infraspinatus/Teres: 5/5    No pain with resisted wrist extension  No pain with resisted wrist flexion    Provocative Tests:   Moving Valgus Stress Test:  Negative  Biceps Hook Test:   Negative  Tinel's overlying Ulnar Nerve Negative    Neurovascular Upper Extremity (Bilateral)  Motor:    5/5 EPL, Finger Abduction, , Pinch, Deltoid  Sensation:   intact to light touch median, ulnar, radial and axillary nerve  Circulation:   Normal, 2+ radial pulse    The contralateral upper extremity is without limitation in range of motion or strength, no positive provocative maneuvers.       Radiographic Examination/Diagnostics:    Elbow XR personally viewed, independently interpreted and radiology report was reviewed.    XR ELBOW, COMPLETE (MIN 3 VIEWS), RIGHT (CPT=73080)  Result Date: 4/5/2025  PROCEDURE:  XR ELBOW, COMPLETE (MIN 3 VIEWS), RIGHT (CPT=73080)  TECHNIQUE:  Three views were obtained.  COMPARISON:  None.  INDICATIONS:  fall while playing basketball yesterday, hit funny bone. pain with ROM,  PATIENT STATED HISTORY: (As transcribed by Technologist)  Basketball injury and a fall. Right hsnd pain and  elblow lateral pain               CONCLUSION:  A right elbow effusion.  Nondisplaced fracture of the radial neck.  No dislocation.   LOCATION:  [facility]    Dictated by (CST): Tomeka Peterson MD on 4/05/2025 at 3:32 PM     Finalized by (CST): Tomeka Peterson MD on 4/05/2025 at 3:33 PM         IMPRESSION: Antwon Mahajan is a 39 year old Right hand dominant male with right nondisplaced radial neck fracture sustained 4/4/25 after landing on the elbow while playing basketball.    This injury pattern merits non-surgical treatment with physical therapy.     PLAN:   We had a detailed discussion outlining the etiology, anatomy, pathophysiology, and natural history of patient's findings. Imaging was reviewed in detail.    We reviewed the treatment of this disease condition.  Fortunately, treatment is amenable to conservative treatment which we chose to optimize at today's visit.  I recommended physical therapy to aid in strengthening, range of motion, functional improvement, and return to baseline activity.      We prescribed Tramadol 50 mg.     The patient had the opportunity to ask questions and all questions were answered appropriately.      FOLLOW-UP:   Return to clinic on an as needed basis.       Boyd Maldonado MD  Knee, Shoulder, & Elbow Surgery / Sports Medicine Specialist  Orthopaedic Surgery  [address] [place] [address].org  [email or web address]  t: [phone]  o: [phone]  f: [phone]    This note was dictated using Dragon software.  While it was briefly proofread prior to completion, some grammatical, spelling, and word choice errors due to dictation may still occur.          [1]   Past Medical History:   Allergic rhinitis    Anxiety    Arthritis    left heel    ASTHMA    Asthma (HCC)    Hyperthyroidism    Hypothyroidism    Sleep apnea   [2]   Past Surgical History:  Procedure Laterality Date    Colonoscopy      Other surgical history      CYST LEFT HAND 2005 [facility]  ORTH[facility]    Other surgical history      LEFT HEEL FRACTURE 2003 [facility]--plate and screws    Shoulder arthroscopy Right 01/2020    torn labrum   [3]   Family History  Problem Relation Age of Onset    Hypertension Father     Heart Disorder Father     Cancer Father         nasal    Circulatory Problems Father         AAA    Musculo-skelatal Disorder Father     Pulmonary Disease Father    [4]   Current Outpatient Medications   Medication Sig Dispense Refill    HYDROcodone-acetaminophen 5-325 MG Oral Tab Take 1 tablet by mouth every 8 (eight) hours as needed for Pain. 12 tablet 0    naproxen 500 MG Oral Tab Take 1 tablet (500 mg total) by mouth 2 (two) times daily with meals for 10 days. 20 tablet 0

## 2025-04-12 ENCOUNTER — PATIENT MESSAGE (OUTPATIENT)
Dept: ORTHOPEDICS CLINIC | Facility: CLINIC | Age: 40
End: 2025-04-12

## 2025-04-14 NOTE — TELEPHONE ENCOUNTER
Dr. Maldonado,    Please sign off on form if you agree to: Accident Insurance form due to Closed nondisplaced fracture of neck of right radius. 04/04/25    -Signature page will be the first page scanned  -From your Inbasket, Highlight the patient and click Chart   -Double click the 04/09/2025 Forms Completion telephone encounter  -Scroll down to the Media section   -Click the blue Hyperlink: disability Dr Maldonado 04/14/25  -Click Acknowledge located in the top right ribbon/menu   -Drag the mouse into the blank space of the document and a + sign will appear. Left click to   electronically sign the document.  -Once signed, simply exit out of the screen and you signature will be saved.     Thank you,    Tracey

## 2025-04-14 NOTE — TELEPHONE ENCOUNTER
Office notes from 03/17/2025 - Dr. Sandoval  ASSESSMENT AND PLAN:   1. VINOD on CPAP  Needs new DME and supplies  Patient is using and benefiting from regular cpap use.  Patient was instructed to clean equipment on a weekly basis.  Patient was instructed to keep up to date with supplies.  Patient was informed to avoid drowsy driving, or using heavy machinery.  Patient was instructed to followup on a annual basis.     Detailed [employer] message sent to pt, pap device ordered to [facility] via [facility].  DME will verify insurance and once approved will contact pt to arrange delivery date/time.  Per [employer] message, pt instructed to follow up with Dr. Sandoval once he/she starts using the device, per their insurance compliance requirement.  Provided call back numbers.

## 2025-04-14 NOTE — TELEPHONE ENCOUNTER
Disability received in forms dept. Logged for processing, valid authorization and Release of Information with forms.
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
## 2025-04-15 DIAGNOSIS — S52.134A CLOSED NONDISPLACED FRACTURE OF NECK OF RIGHT RADIUS, INITIAL ENCOUNTER: ICD-10-CM

## 2025-04-15 RX ORDER — TRAMADOL HYDROCHLORIDE 50 MG/1
TABLET ORAL
Qty: 10 TABLET | Refills: 1 | Status: SHIPPED | OUTPATIENT
Start: 2025-04-15

## 2025-04-15 NOTE — TELEPHONE ENCOUNTER
Tramadol    DOS: n/a  Last OV: 4/9/25  Closed nondisplaced fracture of neck of right radius, initial encounter   Last refill date: 4/11/25 #/refills: 10/0  Upcoming appt:   Future Appointments   Date Time Provider Department Center   9/26/2025  7:40 AM Ilene Young DO [facility] [place]   3/23/2026  9:00 AM Karon Sandoval DO [facility] [place]

## 2025-04-24 DIAGNOSIS — S52.134A CLOSED NONDISPLACED FRACTURE OF NECK OF RIGHT RADIUS, INITIAL ENCOUNTER: ICD-10-CM

## 2025-04-25 NOTE — TELEPHONE ENCOUNTER
Naproxen 500mg    DOS: n/a  Last OV: 4/9/25 Closed nondisplaced fx of neck or right radius (DOI 4/4/25)   Last refill date: 4/15/25 #/refills: 20/0  Upcoming appt: none       2. Tramadol    Last refill date: 4/19/25 #/refills: 10/0       9/26/24 10:58 AM    BUN 14   Creatinine 1.21   eGFR-Cr 77

## 2025-04-28 DIAGNOSIS — S52.134A CLOSED NONDISPLACED FRACTURE OF NECK OF RIGHT RADIUS, INITIAL ENCOUNTER: ICD-10-CM

## 2025-04-28 RX ORDER — NAPROXEN 500 MG/1
500 TABLET ORAL 2 TIMES DAILY WITH MEALS
Qty: 30 TABLET | Refills: 1 | Status: SHIPPED | OUTPATIENT
Start: 2025-04-28

## 2025-04-28 RX ORDER — NAPROXEN 500 MG/1
500 TABLET ORAL 2 TIMES DAILY WITH MEALS
Qty: 20 TABLET | Refills: 0 | OUTPATIENT
Start: 2025-04-28 | End: 2025-05-08

## 2025-04-28 RX ORDER — TRAMADOL HYDROCHLORIDE 50 MG/1
TABLET ORAL
Qty: 10 TABLET | Refills: 0 | OUTPATIENT
Start: 2025-04-28

## 2025-04-28 NOTE — TELEPHONE ENCOUNTER
Tramadol    DOI: 4/4/25  RT ELBOW INJURY   Last OV: 4/9/25  Last refill date: 4/169/25 #/refills: 10/0  Upcoming appt: none         Patient comment: Please provide 1 refill as I still am waking up in pain from the tissues. I have discussed this with Fanta at  who advised she would send a note to Dr malloy. Thank you

## 2025-04-29 RX ORDER — TRAMADOL HYDROCHLORIDE 50 MG/1
TABLET ORAL
Qty: 10 TABLET | Refills: 1 | Status: SHIPPED | OUTPATIENT
Start: 2025-04-29

## 2025-05-09 DIAGNOSIS — S52.134A CLOSED NONDISPLACED FRACTURE OF NECK OF RIGHT RADIUS, INITIAL ENCOUNTER: ICD-10-CM

## 2025-05-09 RX ORDER — TRAMADOL HYDROCHLORIDE 50 MG/1
TABLET ORAL
Qty: 10 TABLET | Refills: 0 | Status: SHIPPED | OUTPATIENT
Start: 2025-05-09

## 2025-05-09 NOTE — TELEPHONE ENCOUNTER
Tramadol    DOI: 4/4/25  Last OV: 4/9/25  Closed nondisplaced fracture of neck of right radius, initial encounter   Last refill date: 5/3/25 #/refills: 10/0  Upcoming appt:   Future Appointments   Date Time Provider Department Center   9/26/2025  7:40 AM Ilene Young, DO [facility] [place]   3/23/2026  9:00 AM Karon Sandoval, DO [facility] [place]          Patient comment: Hello. I over did it at physical therapy today and pain has increased. Would you please provide 1 refill to take at bedtime? Thank you

## 2025-05-13 DIAGNOSIS — S52.134A CLOSED NONDISPLACED FRACTURE OF NECK OF RIGHT RADIUS, INITIAL ENCOUNTER: ICD-10-CM

## 2025-05-15 RX ORDER — TRAMADOL HYDROCHLORIDE 50 MG/1
TABLET ORAL
Qty: 10 TABLET | Refills: 0 | OUTPATIENT
Start: 2025-05-15

## 2025-05-16 DIAGNOSIS — S52.134A CLOSED NONDISPLACED FRACTURE OF NECK OF RIGHT RADIUS, INITIAL ENCOUNTER: ICD-10-CM

## 2025-05-19 DIAGNOSIS — S52.134A CLOSED NONDISPLACED FRACTURE OF NECK OF RIGHT RADIUS, INITIAL ENCOUNTER: ICD-10-CM

## 2025-05-19 RX ORDER — TRAMADOL HYDROCHLORIDE 50 MG/1
TABLET ORAL
Qty: 10 TABLET | Refills: 0 | OUTPATIENT
Start: 2025-05-19

## 2025-05-20 ENCOUNTER — LAB ENCOUNTER (OUTPATIENT)
Dept: LAB | Age: 40
End: 2025-05-20
Attending: FAMILY MEDICINE
Payer: COMMERCIAL

## 2025-05-20 DIAGNOSIS — E78.2 MODERATE MIXED HYPERLIPIDEMIA NOT REQUIRING STATIN THERAPY: ICD-10-CM

## 2025-05-20 LAB
CHOLEST SERPL-MCNC: 167 MG/DL (ref ?–200)
FASTING PATIENT LIPID ANSWER: YES
HDLC SERPL-MCNC: 43 MG/DL (ref 40–59)
LDLC SERPL CALC-MCNC: 94 MG/DL (ref ?–100)
NONHDLC SERPL-MCNC: 124 MG/DL (ref ?–130)
TRIGL SERPL-MCNC: 173 MG/DL (ref 30–149)
VLDLC SERPL CALC-MCNC: 28 MG/DL (ref 0–30)

## 2025-05-20 PROCEDURE — 80061 LIPID PANEL: CPT

## 2025-05-20 PROCEDURE — 36415 COLL VENOUS BLD VENIPUNCTURE: CPT

## 2025-05-20 RX ORDER — TRAMADOL HYDROCHLORIDE 50 MG/1
50 TABLET ORAL NIGHTLY PRN
Qty: 10 TABLET | Refills: 0 | Status: SHIPPED | OUTPATIENT
Start: 2025-05-20

## 2025-05-20 NOTE — TELEPHONE ENCOUNTER
Tramadol 50 mg  DOI: 04/04/25  Last OV: 04/9/25  Last refill date: 05/09/25     #/refills: 10/0  Upcoming appt:   Future Appointments   Date Time Provider Department Center   9/26/2025  7:40 AM Ilene Young, DO [facility] [place]   3/23/2026  9:00 AM Karon Sandoval, DO [facility] [place]     - Patient requesting 1 tab night due to increased pain at night.  Script changed to 1 tab prn nightly.      -  This patient send multiple requests I am not sure if you denied the script or we denied them due to duplicates.

## 2025-05-29 DIAGNOSIS — S52.134A CLOSED NONDISPLACED FRACTURE OF NECK OF RIGHT RADIUS, INITIAL ENCOUNTER: ICD-10-CM

## 2025-05-30 RX ORDER — TRAMADOL HYDROCHLORIDE 50 MG/1
50 TABLET ORAL NIGHTLY PRN
Qty: 10 TABLET | Refills: 0 | Status: SHIPPED | OUTPATIENT
Start: 2025-05-30

## 2025-05-30 NOTE — TELEPHONE ENCOUNTER
Refill request~  Naproxen 500 MG     DOS: n/a  Last OV: 04/09/2025  Last refill date: 05/10/2025     #/refills: 30/0  Upcoming appt: NONE  Future Appointments   Date Time Provider Department Center   9/26/2025  7:40 AM Ilene Young, DO [facility] [place]   3/23/2026  9:00 AM Karon Sandoval, DO [facility] [place]     Labs~    Component  Ref Range & Units (hover) 9/26/24 10:58 AM   Glucose 94   Sodium 137   Potassium 5.0   Chloride 107   CO2 28.0   Anion Gap 2   BUN 14   Creatinine 1.21   Calcium, Total 10.4   Calculated Osmolality 284   eGFR-Cr 77   AST 22   ALT 25   Alkaline Phosphatase 67   Bilirubin, Total 0.7   Total Protein 7.9   Albumin 5.1 High    Globulin 2.8   A/G Ratio 1.8   Patient Fasting for CMP? Yes   Resulting Agency [facility] ([facility])

## 2025-05-30 NOTE — TELEPHONE ENCOUNTER
Tramadol 50 mg  DOI: 04/04/25  Last OV: 04/9/25  Last refill date: 05/20/25     #/refills: 10/0  Upcoming appt:   Future Appointments   Date Time Provider Department Center   9/26/2025  7:40 AM Ilene Young, DO [facility] [place]   3/23/2026  9:00 AM Karon Sandoval, DO [facility] [place]      Patient Comment: Please provide refill for 1 at bedtime. Thank you

## 2025-05-31 ENCOUNTER — APPOINTMENT (OUTPATIENT)
Dept: GENERAL RADIOLOGY | Age: 40
End: 2025-05-31
Attending: NURSE PRACTITIONER
Payer: COMMERCIAL

## 2025-05-31 ENCOUNTER — HOSPITAL ENCOUNTER (OUTPATIENT)
Age: 40
Discharge: HOME | End: 2025-05-31
Payer: COMMERCIAL

## 2025-05-31 VITALS
DIASTOLIC BLOOD PRESSURE: 99 MMHG | RESPIRATION RATE: 17 BRPM | TEMPERATURE: 98 F | SYSTOLIC BLOOD PRESSURE: 134 MMHG | OXYGEN SATURATION: 99 % | HEART RATE: 73 BPM

## 2025-05-31 DIAGNOSIS — S90.32XA CONTUSION OF LEFT FOOT, INITIAL ENCOUNTER: ICD-10-CM

## 2025-05-31 DIAGNOSIS — S97.82XA CRUSHING INJURY OF LEFT FOOT, INITIAL ENCOUNTER: Primary | ICD-10-CM

## 2025-05-31 PROCEDURE — 73630 X-RAY EXAM OF FOOT: CPT | Performed by: NURSE PRACTITIONER

## 2025-05-31 PROCEDURE — 99213 OFFICE O/P EST LOW 20 MIN: CPT | Performed by: NURSE PRACTITIONER

## 2025-05-31 RX ORDER — TRAMADOL HYDROCHLORIDE 50 MG/1
TABLET ORAL EVERY 6 HOURS PRN
Qty: 20 TABLET | Refills: 0 | Status: SHIPPED | OUTPATIENT
Start: 2025-05-31 | End: 2025-06-05
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
## 2025-06-02 RX ORDER — NAPROXEN 500 MG/1
500 TABLET ORAL 2 TIMES DAILY WITH MEALS
Qty: 30 TABLET | Refills: 1 | Status: SHIPPED | OUTPATIENT
Start: 2025-06-02

## 2025-06-04 ENCOUNTER — PATIENT MESSAGE (OUTPATIENT)
Dept: FAMILY MEDICINE CLINIC | Facility: CLINIC | Age: 40
End: 2025-06-04

## 2025-06-05 ENCOUNTER — PATIENT MESSAGE (OUTPATIENT)
Facility: CLINIC | Age: 40
End: 2025-06-05

## 2025-06-06 DIAGNOSIS — S52.134A CLOSED NONDISPLACED FRACTURE OF NECK OF RIGHT RADIUS, INITIAL ENCOUNTER: ICD-10-CM

## 2025-06-06 RX ORDER — TRAMADOL HYDROCHLORIDE 50 MG/1
50 TABLET ORAL NIGHTLY PRN
Qty: 10 TABLET | Refills: 0 | OUTPATIENT
Start: 2025-06-06

## 2025-06-06 NOTE — TELEPHONE ENCOUNTER
Tramadol 50mg    DOI: 04/04/25  Last OV: 04/9/25  Last refill date: 05/31/2025   #/refills: 20/0-   Last refill date : 05/30/2025  #refills 10/0-     NO follow up appointments~ Please advise

## 2025-06-11 NOTE — TELEPHONE ENCOUNTER
Charles replied to 's comment. I replied that he should make an appointment to be seen  routed to

## 2025-06-11 NOTE — TELEPHONE ENCOUNTER
I am concerned about his recent tramadol use between a recent ortho injury and this injury- ortho recommended weaning from tramadol and he used 20 tabs of tramadol in 4 days (5 tabs per day) which seems like a lot. Let's try to get him in for a visit to discuss pain management

## 2025-06-14 ENCOUNTER — OFFICE VISIT (OUTPATIENT)
Dept: FAMILY MEDICINE CLINIC | Facility: CLINIC | Age: 40
End: 2025-06-14
Payer: COMMERCIAL

## 2025-06-14 ENCOUNTER — HOSPITAL ENCOUNTER (OUTPATIENT)
Dept: GENERAL RADIOLOGY | Age: 40
Discharge: HOME | End: 2025-06-14
Attending: PHYSICIAN ASSISTANT
Payer: COMMERCIAL

## 2025-06-14 VITALS
RESPIRATION RATE: 14 BRPM | DIASTOLIC BLOOD PRESSURE: 70 MMHG | WEIGHT: 195 LBS | SYSTOLIC BLOOD PRESSURE: 102 MMHG | HEART RATE: 80 BPM | OXYGEN SATURATION: 99 % | BODY MASS INDEX: 28 KG/M2 | TEMPERATURE: 99 F

## 2025-06-14 DIAGNOSIS — S97.82XD CRUSHING INJURY OF LEFT FOOT, SUBSEQUENT ENCOUNTER: ICD-10-CM

## 2025-06-14 DIAGNOSIS — S97.82XD CRUSHING INJURY OF LEFT FOOT, SUBSEQUENT ENCOUNTER: Primary | ICD-10-CM

## 2025-06-14 PROCEDURE — 99214 OFFICE O/P EST MOD 30 MIN: CPT | Performed by: PHYSICIAN ASSISTANT

## 2025-06-14 PROCEDURE — 73630 X-RAY EXAM OF FOOT: CPT | Performed by: PHYSICIAN ASSISTANT

## 2025-06-14 RX ORDER — TRAMADOL HYDROCHLORIDE 50 MG/1
50 TABLET ORAL EVERY 8 HOURS PRN
Qty: 20 TABLET | Refills: 0 | Status: SHIPPED | OUTPATIENT
Start: 2025-06-14

## 2025-06-14 NOTE — PROGRESS NOTES
The following individual(s) verbally consented to be recorded using ambient AI listening technology and understand that they can each withdraw their consent to this listening technology at any point by asking the clinician to turn off or pause the recording:    Patient name: Antwon Mahajan
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
## 2025-06-20 DIAGNOSIS — S97.82XD CRUSHING INJURY OF LEFT FOOT, SUBSEQUENT ENCOUNTER: ICD-10-CM

## 2025-06-23 NOTE — TELEPHONE ENCOUNTER
Please review; protocol failed/ has no protocol      Recent fills: 06/14/2025,05/31/2025,05/21/2025  Last Rx written: 06/14/2025  Last Office Visit: 06/14/2025    Recent Visits  Date Type Provider Dept   06/14/25 Office Visit Brant Gallardo PA [facility] [place]     Future Appointments  Date Type Provider Dept   09/26/25 Appointment Ilene Young DO [facility] [place]   Showing future appointments within next 150 days with a meds authorizing provider and meeting all other requirements

## 2025-06-24 RX ORDER — TRAMADOL HYDROCHLORIDE 50 MG/1
50 TABLET ORAL EVERY 8 HOURS PRN
Qty: 20 TABLET | Refills: 0 | Status: SHIPPED | OUTPATIENT
Start: 2025-06-24 | End: 2025-06-24

## 2025-06-24 RX ORDER — TRAMADOL HYDROCHLORIDE 50 MG/1
50 TABLET ORAL EVERY 8 HOURS PRN
Qty: 20 TABLET | Refills: 0 | Status: SHIPPED | OUTPATIENT
Start: 2025-06-24

## 2025-06-24 NOTE — TELEPHONE ENCOUNTER
Please review; protocol failed/ has no protocol    Peggy Flores  Signed 8:37 AM     Copy     [facility] called this morning needing clarification on prescription instructions for the Tramadol        Please advise directions for Tramadol sent twice patients [facility] asking for clarification on directions is patient taking 1 tablet by mouth every 8 hours as needed or 1 tablet by mouth at bedtime as needed please clarify ? Please adjust directions of patients prescription for tramadol

## 2025-06-24 NOTE — TELEPHONE ENCOUNTER
[facility] called this morning needing clarification on prescription instructions for the Tramadol. Please call    [facility]-[phone]

## 2025-06-30 DIAGNOSIS — S97.82XD CRUSHING INJURY OF LEFT FOOT, SUBSEQUENT ENCOUNTER: ICD-10-CM

## 2025-06-30 NOTE — TELEPHONE ENCOUNTER
Please review. Protocol Failed; No Protocol      Recent fills: 6/14/2025, 6/24/2025  Last Rx written: 6/24/2025  Last office visit: 12/27/2024    Recent Visits  Date Type Provider Dept   06/14/25 Office Visit Brant Gallardo PA [facility] [place]     Future Appointments  Date Type Provider Dept   08/19/25 Appointment Ilene Young DO [facility] [place]   Showing future appointments within next 150 days with a meds authorizing provider and meeting all other requirements

## 2025-07-04 RX ORDER — TRAMADOL HYDROCHLORIDE 50 MG/1
50 TABLET ORAL EVERY 8 HOURS PRN
Qty: 20 TABLET | Refills: 0 | OUTPATIENT
Start: 2025-07-04

## 2025-07-25 ENCOUNTER — TELEPHONE (OUTPATIENT)
Dept: FAMILY MEDICINE CLINIC | Facility: CLINIC | Age: 40
End: 2025-07-25

## 2025-07-25 DIAGNOSIS — Z12.5 SCREENING FOR MALIGNANT NEOPLASM OF PROSTATE: ICD-10-CM

## 2025-07-25 DIAGNOSIS — Z00.00 ROUTINE HEALTH MAINTENANCE: ICD-10-CM

## 2025-08-08 ENCOUNTER — LAB ENCOUNTER (OUTPATIENT)
Dept: LAB | Age: 40
End: 2025-08-08
Attending: FAMILY MEDICINE

## 2025-08-08 LAB
ALBUMIN SERPL-MCNC: 4.6 G/DL (ref 3.2–4.8)
ALBUMIN/GLOB SERPL: 1.8 (ref 1–2)
ALP LIVER SERPL-CCNC: 63 U/L (ref 45–117)
ALT SERPL-CCNC: 18 U/L (ref 10–49)
ANION GAP SERPL CALC-SCNC: 10 MMOL/L (ref 0–18)
AST SERPL-CCNC: 21 U/L (ref ?–34)
BASOPHILS # BLD AUTO: 0.05 X10(3) UL (ref 0–0.2)
BASOPHILS NFR BLD AUTO: 0.6 %
BILIRUB SERPL-MCNC: 0.5 MG/DL (ref 0.3–1.2)
BUN BLD-MCNC: 13 MG/DL (ref 9–23)
CALCIUM BLD-MCNC: 9.6 MG/DL (ref 8.7–10.6)
CHLORIDE SERPL-SCNC: 107 MMOL/L (ref 98–112)
CHOLEST SERPL-MCNC: 194 MG/DL (ref ?–200)
CO2 SERPL-SCNC: 23 MMOL/L (ref 21–32)
CREAT BLD-MCNC: 1.13 MG/DL (ref 0.7–1.3)
EGFRCR SERPLBLD CKD-EPI 2021: 83 ML/MIN/1.73M2 (ref 60–?)
EOSINOPHIL # BLD AUTO: 0.26 X10(3) UL (ref 0–0.7)
EOSINOPHIL NFR BLD AUTO: 3.2 %
ERYTHROCYTE [DISTWIDTH] IN BLOOD BY AUTOMATED COUNT: 12.7 %
EST. AVERAGE GLUCOSE BLD GHB EST-MCNC: 111 MG/DL (ref 68–126)
FASTING PATIENT LIPID ANSWER: YES
FASTING STATUS PATIENT QL REPORTED: YES
GLOBULIN PLAS-MCNC: 2.6 G/DL (ref 2–3.5)
GLUCOSE BLD-MCNC: 97 MG/DL (ref 70–99)
HBA1C MFR BLD: 5.5 % (ref ?–5.7)
HCT VFR BLD AUTO: 42.7 % (ref 39–53)
HDLC SERPL-MCNC: 52 MG/DL (ref 40–59)
HGB BLD-MCNC: 14.2 G/DL (ref 13–17.5)
IMM GRANULOCYTES # BLD AUTO: 0.02 X10(3) UL (ref 0–1)
IMM GRANULOCYTES NFR BLD: 0.2 %
LDLC SERPL CALC-MCNC: 119 MG/DL (ref ?–100)
LYMPHOCYTES # BLD AUTO: 3.26 X10(3) UL (ref 1–4)
LYMPHOCYTES NFR BLD AUTO: 40.7 %
MCH RBC QN AUTO: 32.1 PG (ref 26–34)
MCHC RBC AUTO-ENTMCNC: 33.3 G/DL (ref 31–37)
MCV RBC AUTO: 96.6 FL (ref 80–100)
MONOCYTES # BLD AUTO: 0.64 X10(3) UL (ref 0.1–1)
MONOCYTES NFR BLD AUTO: 8 %
NEUTROPHILS # BLD AUTO: 3.78 X10 (3) UL (ref 1.5–7.7)
NEUTROPHILS # BLD AUTO: 3.78 X10(3) UL (ref 1.5–7.7)
NEUTROPHILS NFR BLD AUTO: 47.3 %
NONHDLC SERPL-MCNC: 142 MG/DL (ref ?–130)
OSMOLALITY SERPL CALC.SUM OF ELEC: 290 MOSM/KG (ref 275–295)
PLATELET # BLD AUTO: 268 10(3)UL (ref 150–450)
POTASSIUM SERPL-SCNC: 4.3 MMOL/L (ref 3.5–5.1)
PROT SERPL-MCNC: 7.2 G/DL (ref 5.7–8.2)
RBC # BLD AUTO: 4.42 X10(6)UL (ref 4.3–5.7)
SODIUM SERPL-SCNC: 140 MMOL/L (ref 136–145)
T4 FREE SERPL-MCNC: 1.3 NG/DL (ref 0.8–1.7)
TRIGL SERPL-MCNC: 132 MG/DL (ref 30–149)
TSI SER-ACNC: 4.96 UIU/ML (ref 0.55–4.78)
VLDLC SERPL CALC-MCNC: 23 MG/DL (ref 0–30)
WBC # BLD AUTO: 8 X10(3) UL (ref 4–11)

## 2025-08-08 PROCEDURE — 84443 ASSAY THYROID STIM HORMONE: CPT | Performed by: FAMILY MEDICINE

## 2025-08-08 PROCEDURE — 80061 LIPID PANEL: CPT | Performed by: FAMILY MEDICINE

## 2025-08-08 PROCEDURE — 80053 COMPREHEN METABOLIC PANEL: CPT | Performed by: FAMILY MEDICINE

## 2025-08-08 PROCEDURE — 85025 COMPLETE CBC W/AUTO DIFF WBC: CPT | Performed by: FAMILY MEDICINE

## 2025-08-08 PROCEDURE — 83036 HEMOGLOBIN GLYCOSYLATED A1C: CPT | Performed by: FAMILY MEDICINE

## 2025-08-08 PROCEDURE — 84439 ASSAY OF FREE THYROXINE: CPT | Performed by: FAMILY MEDICINE

## 2025-08-08 PROCEDURE — 36415 COLL VENOUS BLD VENIPUNCTURE: CPT | Performed by: FAMILY MEDICINE

## 2025-08-19 ENCOUNTER — OFFICE VISIT (OUTPATIENT)
Dept: FAMILY MEDICINE CLINIC | Facility: CLINIC | Age: 40
End: 2025-08-19

## 2025-08-19 VITALS
HEART RATE: 84 BPM | SYSTOLIC BLOOD PRESSURE: 118 MMHG | OXYGEN SATURATION: 97 % | BODY MASS INDEX: 28.23 KG/M2 | RESPIRATION RATE: 16 BRPM | DIASTOLIC BLOOD PRESSURE: 64 MMHG | WEIGHT: 195 LBS | HEIGHT: 69.49 IN

## 2025-08-19 DIAGNOSIS — Z00.00 ROUTINE HEALTH MAINTENANCE: Primary | ICD-10-CM

## 2025-08-19 DIAGNOSIS — N50.89 TESTICULAR CALCIFICATION: ICD-10-CM

## 2025-08-19 DIAGNOSIS — F41.8 SITUATIONAL ANXIETY: ICD-10-CM

## 2025-08-19 DIAGNOSIS — R07.9 CHEST PAIN, UNSPECIFIED TYPE: ICD-10-CM

## 2025-08-19 DIAGNOSIS — E06.3 HASHIMOTO'S THYROIDITIS: ICD-10-CM

## 2025-08-19 LAB
ATRIAL RATE: 61 BPM
P AXIS: 35 DEGREES
P-R INTERVAL: 158 MS
Q-T INTERVAL: 390 MS
QRS DURATION: 88 MS
QTC CALCULATION (BEZET): 392 MS
R AXIS: 45 DEGREES
T AXIS: 55 DEGREES
VENTRICULAR RATE: 61 BPM

## 2025-08-19 PROCEDURE — 93000 ELECTROCARDIOGRAM COMPLETE: CPT | Performed by: FAMILY MEDICINE

## 2025-08-19 PROCEDURE — 99396 PREV VISIT EST AGE 40-64: CPT | Performed by: FAMILY MEDICINE

## 2025-08-19 PROCEDURE — 99214 OFFICE O/P EST MOD 30 MIN: CPT | Performed by: FAMILY MEDICINE

## 2025-08-19 RX ORDER — FLUOXETINE 10 MG/1
CAPSULE ORAL
Qty: 46 CAPSULE | Refills: 0 | Status: SHIPPED | OUTPATIENT
Start: 2025-08-19 | End: 2025-09-18